# Patient Record
Sex: FEMALE | Race: WHITE
[De-identification: names, ages, dates, MRNs, and addresses within clinical notes are randomized per-mention and may not be internally consistent; named-entity substitution may affect disease eponyms.]

---

## 2019-09-15 ENCOUNTER — HOSPITAL ENCOUNTER (INPATIENT)
Dept: HOSPITAL 92 - ERS | Age: 79
LOS: 6 days | Discharge: HOME HEALTH | DRG: 682 | End: 2019-09-21
Attending: INTERNAL MEDICINE | Admitting: INTERNAL MEDICINE
Payer: MEDICARE

## 2019-09-15 VITALS — BODY MASS INDEX: 28.3 KG/M2

## 2019-09-15 DIAGNOSIS — E11.9: ICD-10-CM

## 2019-09-15 DIAGNOSIS — R33.9: ICD-10-CM

## 2019-09-15 DIAGNOSIS — E87.2: ICD-10-CM

## 2019-09-15 DIAGNOSIS — E44.0: ICD-10-CM

## 2019-09-15 DIAGNOSIS — Z88.5: ICD-10-CM

## 2019-09-15 DIAGNOSIS — E87.6: ICD-10-CM

## 2019-09-15 DIAGNOSIS — E87.1: ICD-10-CM

## 2019-09-15 DIAGNOSIS — I25.10: ICD-10-CM

## 2019-09-15 DIAGNOSIS — Z95.1: ICD-10-CM

## 2019-09-15 DIAGNOSIS — F17.210: ICD-10-CM

## 2019-09-15 DIAGNOSIS — Z71.6: ICD-10-CM

## 2019-09-15 DIAGNOSIS — N17.9: Primary | ICD-10-CM

## 2019-09-15 DIAGNOSIS — G93.41: ICD-10-CM

## 2019-09-15 DIAGNOSIS — E78.5: ICD-10-CM

## 2019-09-15 DIAGNOSIS — I10: ICD-10-CM

## 2019-09-15 DIAGNOSIS — J44.9: ICD-10-CM

## 2019-09-15 DIAGNOSIS — N13.30: ICD-10-CM

## 2019-09-15 PROCEDURE — 80048 BASIC METABOLIC PNL TOTAL CA: CPT

## 2019-09-15 PROCEDURE — 71045 X-RAY EXAM CHEST 1 VIEW: CPT

## 2019-09-15 PROCEDURE — 72100 X-RAY EXAM L-S SPINE 2/3 VWS: CPT

## 2019-09-15 PROCEDURE — 80053 COMPREHEN METABOLIC PANEL: CPT

## 2019-09-15 PROCEDURE — 82550 ASSAY OF CK (CPK): CPT

## 2019-09-15 PROCEDURE — 81001 URINALYSIS AUTO W/SCOPE: CPT

## 2019-09-15 PROCEDURE — 76770 US EXAM ABDO BACK WALL COMP: CPT

## 2019-09-15 PROCEDURE — 84484 ASSAY OF TROPONIN QUANT: CPT

## 2019-09-15 PROCEDURE — 83735 ASSAY OF MAGNESIUM: CPT

## 2019-09-15 PROCEDURE — 85025 COMPLETE CBC W/AUTO DIFF WBC: CPT

## 2019-09-15 PROCEDURE — 70450 CT HEAD/BRAIN W/O DYE: CPT

## 2019-09-15 PROCEDURE — C9113 INJ PANTOPRAZOLE SODIUM, VIA: HCPCS

## 2019-09-15 PROCEDURE — 84156 ASSAY OF PROTEIN URINE: CPT

## 2019-09-15 PROCEDURE — 36416 COLLJ CAPILLARY BLOOD SPEC: CPT

## 2019-09-15 PROCEDURE — 82570 ASSAY OF URINE CREATININE: CPT

## 2019-09-15 PROCEDURE — 74176 CT ABD & PELVIS W/O CONTRAST: CPT

## 2019-09-15 PROCEDURE — 83880 ASSAY OF NATRIURETIC PEPTIDE: CPT

## 2019-09-15 PROCEDURE — 93005 ELECTROCARDIOGRAM TRACING: CPT

## 2019-09-15 PROCEDURE — 93306 TTE W/DOPPLER COMPLETE: CPT

## 2019-09-15 PROCEDURE — 36415 COLL VENOUS BLD VENIPUNCTURE: CPT

## 2019-09-16 LAB
ALBUMIN SERPL BCG-MCNC: 4.2 G/DL (ref 3.4–4.8)
ALP SERPL-CCNC: 94 U/L (ref 40–150)
ALT SERPL W P-5'-P-CCNC: 12 U/L (ref 8–55)
ANION GAP SERPL CALC-SCNC: 16 MMOL/L (ref 10–20)
ANION GAP SERPL CALC-SCNC: 21 MMOL/L (ref 10–20)
AST SERPL-CCNC: 21 U/L (ref 5–34)
BACTERIA UR QL AUTO: (no result) HPF
BASOPHILS # BLD AUTO: 0.1 THOU/UL (ref 0–0.2)
BASOPHILS # BLD AUTO: 0.1 THOU/UL (ref 0–0.2)
BASOPHILS NFR BLD AUTO: 0.5 % (ref 0–1)
BASOPHILS NFR BLD AUTO: 0.7 % (ref 0–1)
BILIRUB SERPL-MCNC: 0.2 MG/DL (ref 0.2–1.2)
BUN SERPL-MCNC: 55 MG/DL (ref 9.8–20.1)
BUN SERPL-MCNC: 56 MG/DL (ref 9.8–20.1)
CALCIUM SERPL-MCNC: 8 MG/DL (ref 7.8–10.44)
CALCIUM SERPL-MCNC: 8.8 MG/DL (ref 7.8–10.44)
CHLORIDE SERPL-SCNC: 108 MMOL/L (ref 98–107)
CHLORIDE SERPL-SCNC: 111 MMOL/L (ref 98–107)
CK SERPL-CCNC: 203 U/L (ref 29–168)
CO2 SERPL-SCNC: 10 MMOL/L (ref 23–31)
CO2 SERPL-SCNC: 9 MMOL/L (ref 23–31)
CREAT CL PREDICTED SERPL C-G-VRATE: 0 ML/MIN (ref 70–130)
CREAT CL PREDICTED SERPL C-G-VRATE: 23 ML/MIN (ref 70–130)
EOSINOPHIL # BLD AUTO: 0 THOU/UL (ref 0–0.7)
EOSINOPHIL # BLD AUTO: 0 THOU/UL (ref 0–0.7)
EOSINOPHIL NFR BLD AUTO: 0.3 % (ref 0–10)
EOSINOPHIL NFR BLD AUTO: 0.4 % (ref 0–10)
GLOBULIN SER CALC-MCNC: 3.4 G/DL (ref 2.4–3.5)
GLUCOSE SERPL-MCNC: 108 MG/DL (ref 83–110)
GLUCOSE SERPL-MCNC: 120 MG/DL (ref 83–110)
HGB BLD-MCNC: 12.6 G/DL (ref 12–16)
HGB BLD-MCNC: 14.3 G/DL (ref 12–16)
LEUKOCYTE ESTERASE UR QL STRIP.AUTO: 25 LEU/UL
LYMPHOCYTES # BLD: 1.5 THOU/UL (ref 1.2–3.4)
LYMPHOCYTES # BLD: 1.9 THOU/UL (ref 1.2–3.4)
LYMPHOCYTES NFR BLD AUTO: 13 % (ref 21–51)
LYMPHOCYTES NFR BLD AUTO: 20 % (ref 21–51)
MCH RBC QN AUTO: 32.8 PG (ref 27–31)
MCH RBC QN AUTO: 33.1 PG (ref 27–31)
MCV RBC AUTO: 94.5 FL (ref 78–98)
MCV RBC AUTO: 95.6 FL (ref 78–98)
MONOCYTES # BLD AUTO: 0.7 THOU/UL (ref 0.11–0.59)
MONOCYTES # BLD AUTO: 0.7 THOU/UL (ref 0.11–0.59)
MONOCYTES NFR BLD AUTO: 6.4 % (ref 0–10)
MONOCYTES NFR BLD AUTO: 7.4 % (ref 0–10)
NEUTROPHILS # BLD AUTO: 6.8 THOU/UL (ref 1.4–6.5)
NEUTROPHILS # BLD AUTO: 9.1 THOU/UL (ref 1.4–6.5)
NEUTROPHILS NFR BLD AUTO: 71.5 % (ref 42–75)
NEUTROPHILS NFR BLD AUTO: 79.8 % (ref 42–75)
PLATELET # BLD AUTO: 275 THOU/UL (ref 130–400)
PLATELET # BLD AUTO: 306 THOU/UL (ref 130–400)
POTASSIUM SERPL-SCNC: 3.3 MMOL/L (ref 3.5–5.1)
POTASSIUM SERPL-SCNC: 3.6 MMOL/L (ref 3.5–5.1)
PROT UR STRIP.AUTO-MCNC: 50 MG/DL
PROT UR-MCNC: 50 MG/DL (ref 1–14)
RBC # BLD AUTO: 3.84 MILL/UL (ref 4.2–5.4)
RBC # BLD AUTO: 4.32 MILL/UL (ref 4.2–5.4)
RBC UR QL AUTO: (no result) HPF (ref 0–3)
SODIUM SERPL-SCNC: 133 MMOL/L (ref 136–145)
SODIUM SERPL-SCNC: 135 MMOL/L (ref 136–145)
WBC # BLD AUTO: 11.5 THOU/UL (ref 4.8–10.8)
WBC # BLD AUTO: 9.5 THOU/UL (ref 4.8–10.8)
WBC UR QL AUTO: (no result) HPF (ref 0–3)

## 2019-09-16 RX ADMIN — HEPARIN SODIUM SCH UNITS: 5000 INJECTION, SOLUTION INTRAVENOUS; SUBCUTANEOUS at 09:34

## 2019-09-16 RX ADMIN — HEPARIN SODIUM SCH UNITS: 5000 INJECTION, SOLUTION INTRAVENOUS; SUBCUTANEOUS at 20:46

## 2019-09-16 NOTE — ULT
US Renal Bilateral STANDARD: 9/16/2019 3:47 PM



CLINICAL HISTORY: Acute kidney injury.



STUDY: Renal ultrasound



COMPARISON: None.                  



FINDINGS:



Right kidney: 

Echogenicity: Normal. 

Masses/cysts:  Tiny anechoic cysts measuring up to 1.2 cm in size.  

Hydronephrosis: Minimal 

Calcifications: None.  

Length: 9.7 cm



Left kidney: 

Echogenicity: Normal. 

Masses/cysts:  None.   

Hydronephrosis: None. 

Calcifications: None.  

Length: 9.7 cm



Limited visualization of the urinary bladder is unremarkable.



IMPRESSION:



1. Minimal right hydronephrosis

2. Right renal cysts



Reported By: Rancho Carrillo 

Electronically Signed:  9/16/2019 6:47 PM

## 2019-09-16 NOTE — RAD
LUMBAR SPINE RADIOGRAPH SERIES 2 TO 3 VIEWS:

 

INDICATION: 

Pain.

 

FINDINGS: 

Moderate multilevel degenerative changes throughout the lumbar spine are present.  There is no acute 
compression fracture or significant subluxation.  Diffuse atherosclerosis is seen.  There is mild rig
ht convexity curvature of the lumbar spine.

 

IMPRESSION: 

Diffuse degenerative change without acute compression fracture or significant subluxation identified.


 

POS: [facility]

## 2019-09-16 NOTE — CT
PRELIMINARY REPORT/[facility]/EMERGENCY AFTER

HOURS PROCEDURE: 

 

EXAM:

CT Head Without Contrast

 

EXAM DATE/TIME:

9/16/2019 12:21 AM

 

CLINICAL HISTORY:

79 years old, female; Weakness, extremity; Patient HX: Patient complains of left hip pain. . Family s
tates that she has weakness and thinks she May be over medicating.

 

TECHNIQUE:

Imaging protocol: Computed tomography of the head without contrast.

 

COMPARISON:

No relevant prior studies available.

 

FINDINGS:

Brain: No mass effect or midline shift. No hemorrhage. Patchy whitte matter hypodensities are nonspec
ific but may be seen in small vessel chronic ischemic changes.

Ventricles: No ventriculomegaly.

Bones/joints: No acute fracture.

Sinuses: Visualized sinuses are unremarkable. No fluid levels.

Mastoid air cells: Visualized mastoid air cells are well aerated. No mastoid effusion.

Soft tissues: Unremarkable.

 

IMPRESSION:

No acute intracranial abnormality.

 

 

 

Thank you for allowing us to participate in the care of your patient.

Dictated and Authenticated by: Karyn Coto MD

09/16/2019 12:35 AM Central Time ([place] & Tu)

 

 

 

 

FINAL REPORT 

 

EMERGENCY AFTER HOURS CT BRAIN:

 

FINDINGS/IMPRESSION: 

I agree with the above provided preliminary interpretation from [facility]. 

No acute intracranial hemorrhage or mass effect. 

Compared to 02/11/11 exam. 

 

 

 

POS: [facility]

## 2019-09-16 NOTE — CON
DATE OF CONSULTATION:  09/16/2019



CONSULTING PHYSICIAN:  Dr. Hernandez.



REASON FOR CONSULT:  Acute kidney injury.



REASON FOR ADMISSION:  Weakness and nausea.



HISTORY OF PRESENT ILLNESS:  This is a 79-year-old white female with history of type

2 diabetes, hypertension, hyperlipidemia, came to the hospital with weakness and she

has been having a lot of back pain lately.  She has not seen doctor for years now.

Last lab was in __________ creatinine was elevated and she remains confused.

Nephrology is consulted.  The patient is very somnolent.  Family was at the bedside.

 The patient is not taking any medications. 



PAST MEDICAL HISTORY:  Positive for type 2 diabetes, hypertension, hyperlipidemia,

coronary artery disease. 



PAST SURGICAL HISTORY:  Back surgery and prior CABG.



HOME MEDICATIONS:  None.



ALLERGIES:  TO HYDROCODONE.



SOCIAL HISTORY:  She smokes 1.5 pack a day.  No alcohol use.



FAMILY HISTORY:  No history of kidney disease.



REVIEW OF SYSTEMS:  Could not be obtained as the patient is somnolent.



PHYSICAL EXAMINATION:

GENERAL:  This is a thin built white female, no apparent distress. 

VITAL SIGNS:  Temperature 97.6, pulse 78, respiratory rate 18, blood pressure

135/53. 

HEENT:  Atraumatic and normocephalic.  Oral mucosa moist. 

NECK:  Supple. 

CV:  S1 and S2 heard.  Rate and rhythm regular. 

RESPIRATORY:  Clear. 

GI:  Abdomen is soft. 

MUSCULOSKELETAL:  No tenderness.  No edema. 

DERMATOLOGIC:  No skin rash. 

NEUROLOGIC:  Alert and awake. 

PSYCHIATRIC:  Normal mood and affect.



LABORATORY DATA:  Hemoglobin is 12.6.  Potassium 3.3, BUN is 55, creatinine is 2.3.

Renal ultrasound is pending. 



ASSESSMENT AND PLAN:  

1. Acute kidney injury.  Creatinine is slightly better.  We will continue hydration.

2. Severe metabolic acidosis.  We will add bicarb.

3. Hypokalemia.  We will add potassium supplements.  We will check magnesium level.

4. Hyponatremia.

5. Edema, controlled.

6. We will check urine studies and ultrasound.

7. Avoid nephrotoxins.  Continue hydration.  We will add bicarb and monitor and

replace potassium. 







Job ID:  [id number]

## 2019-09-16 NOTE — HP
CHIEF COMPLAINT:  Generalized weakness, nausea, and altered mental status.



HISTORY OF PRESENT ILLNESS:  Ms. Malagon is a 79-year-old female with past medical

history of coronary artery disease, type 2 diabetes, hyperlipidemia, hypertension,

not on any medications, presented to the emergency room with generalized weakness,

left hip pain, decreased oral intake, abdominal pain, and increasingly confused as

per family.  The patient stated that she has been having abdominal pain in the last

few days and a pain for which she has been taking ibuprofen.  Workup in the

emergency room, the patient was found to be in acute renal failure with elevated

creatinine.  Imaging studies were done in the ED.  Reports are not available at the

time of dictation, but as per ER physician, no acute findings.  The patient is being

admitted to hospital for further management. 



PAST MEDICAL HISTORY:  

1. Diabetes mellitus, type 2.

2. Hypertension.

3. Hyperlipidemia.

4. Coronary artery disease.



PAST SURGICAL HISTORY:  

1. Back surgery x2.

2. Coronary artery bypass graft surgery, 4-vessel.



SOCIAL HISTORY:  She is a cigarette smoker, about 1.5 pack a day.  Denies alcohol

drinking. 



ALLERGIES:  ALLERGIC TO HYDROCODONE.



HOME MEDICATIONS:  None.



FAMILY HISTORY:  Reviewed and noncontributory.



REVIEW OF SYSTEMS:  Review of 14 systems negative except what is mentioned in

history of present illness. 



PHYSICAL EXAMINATION:

GENERAL:  The patient is awake, alert, does not appear to be in acute distress. 

VITAL SIGNS:  Blood pressure 160/57, pulse is 83, respiratory rate is 20,

temperature is 97.5, pulse oximetry is 98% on 1 L/minute nasal cannula. 

HEAD AND NECK:  Normocephalic and atraumatic.  Neck is supple.  No JVD. 

CHEST:  Fair bilateral air entry. 

HEART:  S1, S2.  Regular. 

ABDOMEN:  Soft with mid abdominal and epigastric tenderness, bowel sounds present. 

NEUROLOGIC:  Awake, alert, oriented.  No focal deficits. 

PSYCH:  Unable to assess. 

EXTREMITIES:  No clubbing, no cyanosis.



LABORATORY DATA:  WBC is 11.5, hemoglobin is 14.3, platelets 306.  Sodium 135,

potassium 3.6, BUN 56, creatinine 2.6, glucose 120.  X-ray of the lumbar spine,

chest x-ray, and brain CT done, report is not available at the time of dictation. 



ASSESSMENT:  

1. Acute renal failure.

2. Acute metabolic encephalopathy.

3. Coronary artery disease with history of coronary artery bypass graft surgery.

4. Diabetes mellitus type 2, diet controlled.

5. Cigarette smoker.



PLAN:  

1. Admit.

2. Cautious IV fluid hydration.

3. Monitor kidney function and urine output.

4. Reassess fluid management in a.m.

5. May need Nephrology consultation in a.m. if no improvement.

6. Avoid nephrotoxic drugs.

7. DVT prophylaxis with SCDs.

8. Expected length of stay two midnights or more.







Job ID:  [id number]

## 2019-09-16 NOTE — RAD
FRONTAL VIEW CHEST:

 

COMPARISON: 

9/23/2016.

 

FINDINGS: 

There is hyperinflation of the lungs with interstitial prominence.  Prior sternotomy.  Cardiac silhou
ette is stable.  There is vascular calcification.

 

IMPRESSION: 

Chronic obstructive pulmonary disease.

 

POS: [facility]

## 2019-09-17 LAB
ALBUMIN SERPL BCG-MCNC: 3.4 G/DL (ref 3.4–4.8)
ALP SERPL-CCNC: 84 U/L (ref 40–150)
ALT SERPL W P-5'-P-CCNC: 14 U/L (ref 8–55)
ANION GAP SERPL CALC-SCNC: 13 MMOL/L (ref 10–20)
AST SERPL-CCNC: 25 U/L (ref 5–34)
BILIRUB SERPL-MCNC: 0.3 MG/DL (ref 0.2–1.2)
BUN SERPL-MCNC: 43 MG/DL (ref 9.8–20.1)
CALCIUM SERPL-MCNC: 8.5 MG/DL (ref 7.8–10.44)
CHLORIDE SERPL-SCNC: 116 MMOL/L (ref 98–107)
CO2 SERPL-SCNC: 13 MMOL/L (ref 23–31)
CREAT CL PREDICTED SERPL C-G-VRATE: 36 ML/MIN (ref 70–130)
GLOBULIN SER CALC-MCNC: 2.9 G/DL (ref 2.4–3.5)
GLUCOSE SERPL-MCNC: 121 MG/DL (ref 83–110)
MAGNESIUM SERPL-MCNC: 2.2 MG/DL (ref 1.6–2.6)
POTASSIUM SERPL-SCNC: 3.4 MMOL/L (ref 3.5–5.1)
SODIUM SERPL-SCNC: 139 MMOL/L (ref 136–145)

## 2019-09-17 RX ADMIN — HEPARIN SODIUM SCH UNITS: 5000 INJECTION, SOLUTION INTRAVENOUS; SUBCUTANEOUS at 08:53

## 2019-09-17 RX ADMIN — HEPARIN SODIUM SCH UNITS: 5000 INJECTION, SOLUTION INTRAVENOUS; SUBCUTANEOUS at 20:00

## 2019-09-17 NOTE — CT
CT Abdomen Pelvis WO Con: 9/17/2019 12:00 AM



HISTORY: Hydronephrosis



COMPARISON: None.



TECHNIQUE: 

Multiple contiguous axial images were obtained and a CT of the abdomen and pelvis without IV contrast
. Coronal and sagittal reformats were performed.



FINDINGS:

This examination is limited for the evaluation of solid organs and vascular structures due to the lac
k of intravenous contrast.



Lower Chest: Atelectasis in the lung bases.



Abdomen:

Liver: within normal limits.

Bile Ducts: Normal caliber.

Gallbladder: Removed

Pancreas: within normal limits.

Spleen: within normal limits.

Adrenals: within normal limits.

Kidneys: Subcentimeter hypodensities in both kidneys are too small to definitely characterize but lik
ely represent cysts. No hydronephrosis is seen.



Pelvis:

Reproductive Organs: No pelvic masses.

Ureters: within normal limits.

Bladder: Decompressed by a Perry catheter.



Bowel: Normal caliber. Scattered diverticula in the colon. Normal appendix

Mesenteric Lymph Nodes: No enlarged mesenteric lymph nodes.

Peritoneum: No ascites or free air, no fluid collection.

Vessels: Atherosclerotic calcifications in the aorta

Retroperitoneum: within normal limits.

Abdominal Wall: within normal limits.

Bones: Degenerative changes in the spine.  



IMPRESSION:





1. No evidence of acute intraabdominal or pelvic abnormality.

2. Diverticulosis

3. Bilateral renal cysts



Reported By: Rancho Carrillo 

Electronically Signed:  9/17/2019 9:33 PM

## 2019-09-17 NOTE — PRG
DATE OF SERVICE:  09/17/2019



SUBJECTIVE:  Patient was seen and examined at bedside and overnight events noted. 



Patient denies any shortness of breath or chest pain or palpitation. 



No history of nausea or vomiting or diarrhea or fever or chills or cramps.



OBJECTIVE:  GENERAL:  This is an elderly female, in no apparent distress. 

VITAL SIGNS:  Temperature 98.3.  Heart rate 82.  Respiratory rate 16.  Blood

pressure 178/60. 

HEENT:  Atraumatic, normocephalic.  Oral mucosa is moist 

NECK:  Supple. 

CARDIOVASCULAR:  S1, S2 heard.  Rate and rhythm regular. 

RESPIRATORY:  Clear to auscultation. 

GASTROINTESTINAL:  Abdomen is soft. 

MUSCULOSKELETAL:  No tenderness.  No edema. 

DERMATOLOGIC:  No skin rash. 

NEUROLOGIC:  Alert and awake and oriented X3.  No focal neurologic deficits. Moving

all the extremities. 

PSYCHIATRIC:  Mood and affect normal.



LABORATORY DATA:  Potassium is 3.4, BUN is 43, creatinine is 1.5.



ASSESSMENT AND PLAN:  

1. Acute kidney injury.  Creatinine getting better.

2. Urinary retention.  We will have continue on Perry.  We will have Urology consult.

3. Severe metabolic acidosis, better.  We will monitor for now.  Might add bicarb.

4. Hypokalemia.  Replace and monitor.

5. Hyponatremia.

6. Edema, controlled.

7. Altered mentation, better. 



Renal function seems to be getting better.  We will have Urology input also given

the urinary retention.  Continue Perry for now. 







Job ID:  [id number]

## 2019-09-17 NOTE — PDOC.HOSPP
- Subjective


Encounter Date: 09/17/19


Subjective: 





Two daughters at bedside, able to provide excellent history.  Patient is in 

chair, eyes closed, drifts off to sleep intermittently, will awaken and answer 

questions.  Speech slurred but per family this is improved.  Sips of oral 

fluids.  Daughter noted oxygen occasionally reading 80%; while in room, oxygen 

applied, order entered





Baseline activity at home is good - she cooks/goes out on errands.  AMS noted 

by family Ho 9/15





- Objective


Vital Signs & Weight: 


 Vital Signs (12 hours)











  Temp Pulse Resp BP Pulse Ox


 


 09/17/19 08:00  98.9 F  85  14  188/66 H  91 L


 


 09/17/19 04:10  98.1 F  86  16  181/66 H  92 L








 Weight











Admit Weight                   164 lb


 


Weight                         164 lb 14.492 oz














I&O: 


 











 09/16/19 09/17/19 09/18/19





 06:59 06:59 06:59


 


Intake Total 300 800 


 


Output Total  1670 


 


Balance 300 -870 











Result Diagrams: 


 09/16/19 04:27





 09/17/19 04:06





Hospitalist ROS





- Medication


Medications: 


Active Medications











Generic Name Dose Route Start Last Admin





  Trade Name Freq  PRN Reason Stop Dose Admin


 


Heparin Sodium (Porcine)  5,000 units  09/16/19 09:00  09/17/19 08:53





  Heparin  SC   5,000 units





  BID KARISSA   Administration





     





     





     





     


 


Pantoprazole Sodium  40 mg  09/16/19 09:00  09/17/19 08:55





  Protonix  IVP  09/19/19 09:01  40 mg





  DAILY KARISSA   Administration





     





     





     





     














- Exam


General - other findings: Opens eyes briefly with stimulation, then drifts back 

to sleep


Eye: PERRL


ENT: dry oral mucosa


Neck: supple


Heart: RRR


Heart - other findings: soft RUSB murmur


Respiratory - other findings: Distant, fairly clear


Gastrointestinal: soft, non-tender, non-distended


Extremities: no edema


Skin: no rashes


Neurological: no focal deficits


Neurological - other findings: slurred speech present, oriented to person, 

knows daughters names


Musculoskeletal: generalized weakness


Psychiatric - other findings: Oriented to person, "2019", "nursing home"





Hosp A/P


(1) ARF (acute renal failure)


Status: Acute   





(2) Metabolic acidosis


Code(s): E87.2 - ACIDOSIS   Status: Acute   





(3) Acute metabolic encephalopathy


Code(s): G93.41 - METABOLIC ENCEPHALOPATHY   Status: Acute   





(4) Coronary artery disease


Code(s): I25.10 - ATHSCL HEART DISEASE OF NATIVE CORONARY ARTERY W/O ANG PCTRS 

  Status: Acute   





(5) Type 2 diabetes mellitus


Status: Acute   





(6) Tobacco dependence


Code(s): F17.200 - NICOTINE DEPENDENCE, UNSPECIFIED, UNCOMPLICATED   Status: 

Acute   





(7) COPD (chronic obstructive pulmonary disease)


Status: Acute   





(8) Hypokalemia


Code(s): E87.6 - HYPOKALEMIA   Status: Acute   





- Plan


PT/OT, DVT proph w/heparin





Renal - appreciate Dr. Martinez, renal US noted, per family urology consult 

planned.  Profound metabolic acidosis without clear etiology; continue bicarb 

infusion, check AML; pollock noted on exam; appears intravascularly dry


Encephalopathy - appears more global, less likely vascular event; general trend 

toward improvement


PT/OT as able


Endo - add accuchecks and ISS, clear liquids as tolerated, speech consult noted

, participation limited due to encephalopathy


DVT proph - heparin


Tobacco dependence/COPD - add oxygen prn; goal sat >90%; consider recheck CXR 

tomorrow if not improving.


Hypokalemia - repleted

## 2019-09-18 LAB
ANION GAP SERPL CALC-SCNC: 12 MMOL/L (ref 10–20)
BASOPHILS # BLD AUTO: 0 THOU/UL (ref 0–0.2)
BASOPHILS NFR BLD AUTO: 0.6 % (ref 0–1)
BUN SERPL-MCNC: 29 MG/DL (ref 9.8–20.1)
CALCIUM SERPL-MCNC: 9.4 MG/DL (ref 7.8–10.44)
CHLORIDE SERPL-SCNC: 116 MMOL/L (ref 98–107)
CO2 SERPL-SCNC: 19 MMOL/L (ref 23–31)
CREAT CL PREDICTED SERPL C-G-VRATE: 56 ML/MIN (ref 70–130)
EOSINOPHIL # BLD AUTO: 0 THOU/UL (ref 0–0.7)
EOSINOPHIL NFR BLD AUTO: 0.6 % (ref 0–10)
GLUCOSE SERPL-MCNC: 111 MG/DL (ref 83–110)
HGB BLD-MCNC: 11.8 G/DL (ref 12–16)
LYMPHOCYTES # BLD: 1.9 THOU/UL (ref 1.2–3.4)
LYMPHOCYTES NFR BLD AUTO: 26.7 % (ref 21–51)
MCH RBC QN AUTO: 32.4 PG (ref 27–31)
MCV RBC AUTO: 95.6 FL (ref 78–98)
MONOCYTES # BLD AUTO: 0.8 THOU/UL (ref 0.11–0.59)
MONOCYTES NFR BLD AUTO: 10.6 % (ref 0–10)
NEUTROPHILS # BLD AUTO: 4.5 THOU/UL (ref 1.4–6.5)
NEUTROPHILS NFR BLD AUTO: 61.5 % (ref 42–75)
PLATELET # BLD AUTO: 305 THOU/UL (ref 130–400)
POTASSIUM SERPL-SCNC: 3.7 MMOL/L (ref 3.5–5.1)
RBC # BLD AUTO: 3.64 MILL/UL (ref 4.2–5.4)
SODIUM SERPL-SCNC: 143 MMOL/L (ref 136–145)
WBC # BLD AUTO: 7.3 THOU/UL (ref 4.8–10.8)

## 2019-09-18 RX ADMIN — HEPARIN SODIUM SCH UNITS: 5000 INJECTION, SOLUTION INTRAVENOUS; SUBCUTANEOUS at 20:48

## 2019-09-18 RX ADMIN — HEPARIN SODIUM SCH UNITS: 5000 INJECTION, SOLUTION INTRAVENOUS; SUBCUTANEOUS at 09:02

## 2019-09-18 NOTE — CON
DATE OF CONSULTATION:  09/17/2019



REQUESTING PHYSICIAN:  Denisa Martinez MD



REASON FOR CONSULTATION:  Urinary retention.



HISTORY OF PRESENT ILLNESS:  Ms. Malagon is a 79-year-old female with no

significant past urologic history, who was admitted for altered mental status,

abdominal pain, and acute kidney injury.  The patient has been having right-sided

abdominal and flank pain radiating to her hip for which she has been taking

ibuprofen at home.  The patient had worsening mental status and her family brought

her to the emergency department.  She was found to have acute kidney injury.  Renal

ultrasound demonstrated some very mild dilation of the right renal collecting system

and normal left kidney.  Per the medical record and per the family evidently, the

patient was in urinary retention.  Evidently, in and out catheter was performed,

which demonstrated minimal residual.  However, when indwelling Perry catheter was

placed, there was significant residual.  This was not confirmed by Nursing clearly

nor by the medical record, but this is the report of the family.  The patient since

being in the hospital has had very slow improvement in her mental status.  The

majority of the history is obtained from the medical record, as well as the

patient's daughter.  The patient's creatinine on admission was 2.38, this improved

to 1.54.  Urology was consulted for further evaluation. 



The patient's family denies any history of kidney stones.  No recurrent urinary

tract infections.  She has never had to see a urologist before.  She does not

receive routine medical care. 



REVIEW OF SYSTEMS:  Unable to obtain secondary to patient's condition.



PAST MEDICAL HISTORY:  Type 2 diabetes mellitus, hypertension, hyperlipidemia,

coronary artery disease. 



PAST SURGICAL HISTORY:  Back surgery x2, coronary artery bypass graft, four-vessel.



SOCIAL HISTORY:  Smokes 1.5 packs per day.  No alcohol.



ALLERGIES:  HYDROCODONE.



HOME MEDICATIONS:  None.



FAMILY HISTORY:  Noncontributory.



PHYSICAL EXAMINATION:

VITAL SIGNS:  Temperature is 98.3, pulse 82, blood pressure 197/71, oxygen

saturation 94% on 2 L nasal cannula. 

GENERAL:  She is somnolent in bed, in no apparent distress. 

HEENT:  Normocephalic, atraumatic. 

NECK:  Supple.  No masses or lymphadenopathy. 

CARDIOVASCULAR:  Regular rate and rhythm. 

PULMONARY:  Breathing unlabored. 

ABDOMEN:  Soft, thin, nontender/nondistended.  No masses or organomegaly.  No

suprapubic tenderness to palpation.  No CVA tenderness. 

GENITOURINARY:  Perry catheter is in place, draining clear yellow urine. 

EXTREMITIES:  Warm, well perfused.  No edema. 

NEUROLOGIC:  The patient able to be awakened and will answer questions, although is

somewhat somnolent. 



RADIOLOGY DATA:  Renal ultrasound demonstrates minimal right hydronephrosis and

right renal cysts. 



LABORATORY DATA:  White blood cell count 9.5, at admission was 11.5; hemoglobin

12.6; hematocrit 36.7; platelets 275.  Sodium 139, potassium 3.4, chloride 116,

bicarb 13, BUN 43, creatinine 1.54, 2.3 on admission. 



ASSESSMENT:  A 79-year-old female with minimal right hydronephrosis, acute kidney

injury, possible urinary retention, altered mental status. 



PLAN:  Etiology of the patient's symptoms is unclear.  It is also unclear whether or

not she was truly in urinary retention.  She did not have bilateral hydronephrosis

and there was only very minimal dilation of the right collecting system on her

ultrasound, so etiology of the acute kidney injury may be multifactorial and not

entirely obstructive in nature.  We will obtain a CT of the abdomen and pelvis to

further evaluate the hydronephrosis.  This may have resolved and no other Perry

catheter is in place.  Further recommendations to follow based on CT results.  If CT

is normal, will plan to leave the patient's Perry catheter in place and she can

follow up with Urology as an outpatient for further workup of her urinary retention. 



Thank you for allowing me to participate in the care of this patient.







Job ID:  [id number]

## 2019-09-18 NOTE — PDOC.HOSPP
- Subjective


Encounter Date: 09/18/19


Encounter Time: 13:45


Subjective: 





pt up in bed no complains. family at bedside





- Objective


Vital Signs & Weight: 


 Vital Signs (12 hours)











  Temp Pulse Resp BP Pulse Ox


 


 09/18/19 11:23  97.6 F  79  18  192/73 H  91 L


 


 09/18/19 07:36  97.6 F  68  15  165/58 H  91 L


 


 09/18/19 03:20  97.9 F  69  16  165/71 H  93 L








 Weight











Admit Weight                   164 lb


 


Weight                         164 lb 14.492 oz














I&O: 


 











 09/17/19 09/18/19 09/19/19





 06:59 06:59 06:59


 


Intake Total 800 590 


 


Output Total 3790 1375 


 


Balance -250 -045 











Result Diagrams: 


 09/18/19 05:19





 09/18/19 05:19


Additional Labs: 


 Accuchecks











  09/18/19 09/18/19 09/17/19





  11:07 05:07 21:01


 


POC Glucose  137 H  127 H  137 H














  09/17/19





  16:27


 


POC Glucose  105














Hospitalist ROS





- Review of Systems


Respiratory: denies: cough, dry, shortness of breath, hemoptysis, SOB with 

excertion, pleuritic pain, sputum, wheezing, other


Cardiovascular: denies: chest pain, palpitations, orthopnea, paroxysmal noc. 

dyspnea, edema, light headedness, other


Gastrointestinal: denies: nausea, vomiting, abdominal pain, diarrhea, 

constipation, melena, hematochezia, other





- Medication


Medications: 


Active Medications











Generic Name Dose Route Start Last Admin





  Trade Name Freq  PRN Reason Stop Dose Admin


 


Bisacodyl  10 mg  09/17/19 14:27  09/17/19 15:56





  Dulcolax  GA   10 mg





  DAILYPRN PRN   Administration





  Constipation   





     





     





     


 


Clonidine  0.1 mg  09/18/19 00:48  09/18/19 00:54





  Catapres  PO   0.1 mg





  Q4H PRN   Administration





  SBP Greater Than 180   





     





     





     


 


Heparin Sodium (Porcine)  5,000 units  09/16/19 09:00  09/18/19 09:02





  Heparin  SC   5,000 units





  BID KARISSA   Administration





     





     





     





     


 


Labetalol HCl  10 mg  09/17/19 13:03  09/18/19 00:00





  Normodyne  SLOW IVP   10 mg





  Q4H PRN   Administration





  SBP Greater Than 180   





     





     





     


 


Pantoprazole Sodium  40 mg  09/16/19 09:00  09/18/19 09:04





  Protonix  IVP  09/19/19 09:01  40 mg





  DAILY KARISSA   Administration





     





     





     





     














- Exam


Neck: negative: supple, symmetric, no JVD, no thyromegaly, no lymphadenopathy, 

no carotid bruit, JVD


Heart: negative: RRR, no murmur, no gallops, no rubs, normal peripheral pulses, 

irregular, diminshed peripheral pulses, murmur present, II/IV, III/IV


Respiratory: negative: CTAB, no wheezes, no rales, no ronchi, normal chest 

expansion, no tachypnea, normal percussion, rales, rhonchi, tachypneic, wheezes





Hosp A/P


(1) Acute metabolic encephalopathy


Code(s): G93.41 - METABOLIC ENCEPHALOPATHY   Status: Acute   





(2) ARF (acute renal failure)


Status: Acute   





(3) COPD (chronic obstructive pulmonary disease)


Status: Acute   





(4) Metabolic acidosis


Code(s): E87.2 - ACIDOSIS   Status: Acute   





(5) Type 2 diabetes mellitus


Status: Acute   





- Plan





pt feels much better today. daughter updated, keo improved, will leave pollock in 

per urology recommendation. will get a echo

## 2019-09-18 NOTE — PQF
CLINICAL DOCUMENTATION IMPROVEMENT CLARIFICATION FORM:  ICD-10 Updated



PLEASE DO AN ADDENDUM TO THE PROGRESS NOTE WITH ANY DOCUMENTATION UPDATES OR 
ADDITIONS AND CARRY THROUGH TO DC SUMMARY.   THANK YOU.



Date:               9/18/19                                            ATTN:    
DR. BATRES



Please exercise your independent, professional judgment in responding to the 
clarification form. 

Clinical indicators are provided on the bottom of this form for your review



Please check appropriate box(s):

[  ] Protein Calorie Malnutrition:    [  ] Mild      [  x] Moderate   [  ] 
Severe   

[  ] Other Malnutrition (please specify) _______________________________________
__

[  ] Underweight without malnutrition

[  ] Cachexia 

[  ] Other diagnosis ___________

[  ] Unable to determine



In addition, please specify:

Present on Admission (POA):  [ x ] Yes             [  ] No             [  ] 
Unable to determine



CLINICAL INDICATORS - SIGNS / SYMPTOMS / LABS



DIETARY NOTE 9/16: "NOT BEEN EATING WELL FOR 3 WEEKS...ONLY BITES HERE AND 
THERE."

"SEVERE TEMPORAL MUSCLE WASTING OBSERVED"



NURSING ASSESSMENT 9/17: "WEAK AND CHAIRFAST"



OCCUPATIONAL THERAPY NOTE 9/16: "IMPAIRED ADLS, DECREASED FUNCTIONAL MOBILITY, 
DECREASED ACTIVITY TOLERANCE"



RISKS:

ADVANCED AGE

ACUTE RENAL FAILURE (H&P 6/16)

DIABETES (H&P 9/16)

DECREASED COGNITION (PER OCCUPATIONAL THERAPY NOTE 9/16)



TREATMENT:

RECOMMENDATIONS FOR NUTRITIONAL SUPPLEMENTS TID (PER DIETARY NOTE 9/16)







Moderate Malnutrition (in acute illness)

Energy Intake: <75% of estimated energy requirement for > 7 days

Weight Loss:  1-2%/1 week;  5%/ 1 month; 7.5%/3 months

Other: mild body fat loss; mild muscle mass loss; mild fluid accumulation; 

Severe Malnutrition (in acute illness)

Energy Intake: < 50% of estimated energy requirement for > 5 days

Weight Loss: >1-2%/1 week; >5%/1 month; >7.5%/3 months

Other: moderate body fat loss; moderate muscle mass loss; moderate- severe 
fluid accumulation; measurably reduced  strength

Moderate Malnutrition (in chronic illness)

Energy Intake: <75% of estimated energy requirement for >1 month

Weight Loss: 5%/1 month; 7.5%/3 months; 10%/6 months; 20%/1 year

Other: mild body fat loss; mild muscle mass loss; mild fluid accumulation

Severe Malnutrition (in chronic illness)

Energy Intake: <75% of estimated energy requirement for >1 month

Weight Loss: >5%/1 month; >7.5%/3 months; >10%/6 months; >20%/1 year

Other: severe body fat loss; severe muscle mass loss; severe fluid accumulation
; measurably reduced  strength









(This form is maintained as a part of the permanent medical record)

 2015 [employer]  All Rights Reserved

[email or web address]    Office:  [phone]

                                                              

 

[facility]

## 2019-09-18 NOTE — PRG
DATE OF SERVICE:  09/18/2019



SUBJECTIVE:  Patient was seen and examined at bedside and overnight events noted. 



Patient denies any shortness of breath or chest pain or palpitation. 



No history of nausea or vomiting or diarrhea or fever or chills or cramps.



OBJECTIVE:  GENERAL:  This is a well-built female, in no apparent distress. 

VITAL SIGNS:  Temperature 97.6.  Heart rate 79.  Respiratory rate 18.  Blood

pressure 192/73. 

HEENT:  Atraumatic, normocephalic.  Oral mucosa is moist 

NECK:  Supple. 

CARDIOVASCULAR:  S1, S2 heard.  Rate and rhythm regular. 

RESPIRATORY:  Clear to auscultation. 

GASTROINTESTINAL:  Abdomen is soft. 

MUSCULOSKELETAL:  No tenderness.  No edema. 

DERMATOLOGIC:  No skin rash. 

NEUROLOGIC:  Alert and awake and oriented X3.  No focal neurologic deficits. Moving

all the extremities. 

PSYCHIATRIC:  Mood and affect normal.



LABORATORY DATA:  Potassium is 3.7, BUN is 29, and creatinine is 0.9.



ASSESSMENT AND PLAN:  

1. Acute kidney injury, much better, almost back to normal.

2. Urinary retention.  Follow Urology.

3. Metabolic acidosis.  Getting better.

4. Hypokalemia.  Replace and monitor.

5. Hyponatremia.

6. Edema.  Controlled.

7. Altered mentation.  Better. 



Stop IV fluids and monitor renal function.  Continue potassium supplements if

needed.  Potassium seems to be stable today. 







Job ID:  [id number]

## 2019-09-19 LAB
ANION GAP SERPL CALC-SCNC: 11 MMOL/L (ref 10–20)
BUN SERPL-MCNC: 24 MG/DL (ref 9.8–20.1)
CALCIUM SERPL-MCNC: 9.2 MG/DL (ref 7.8–10.44)
CHLORIDE SERPL-SCNC: 114 MMOL/L (ref 98–107)
CO2 SERPL-SCNC: 19 MMOL/L (ref 23–31)
CREAT CL PREDICTED SERPL C-G-VRATE: 58 ML/MIN (ref 70–130)
GLUCOSE SERPL-MCNC: 204 MG/DL (ref 83–110)
POTASSIUM SERPL-SCNC: 3.4 MMOL/L (ref 3.5–5.1)
SODIUM SERPL-SCNC: 141 MMOL/L (ref 136–145)

## 2019-09-19 RX ADMIN — HEPARIN SODIUM SCH UNITS: 5000 INJECTION, SOLUTION INTRAVENOUS; SUBCUTANEOUS at 08:29

## 2019-09-19 RX ADMIN — HEPARIN SODIUM SCH UNITS: 5000 INJECTION, SOLUTION INTRAVENOUS; SUBCUTANEOUS at 21:02

## 2019-09-19 NOTE — PRG
DATE OF SERVICE:  09/19/2019



SUBJECTIVE:  Patient was seen and examined at bedside and overnight events noted. 



Patient denies any shortness of breath or chest pain or palpitation. 



No history of nausea or vomiting or diarrhea or fever or chills or cramps.



OBJECTIVE:  GENERAL:  This is an elderly female, in no apparent distress. 

VITAL SIGNS:  Temperature 98.6.  Pulse 76.  Respiratory rate 14.  Blood pressure

172/67. 

HEENT:  Atraumatic, normocephalic.  Oral mucosa is moist 

NECK:  Supple. 

CARDIOVASCULAR:  S1, S2 heard.  Rate and rhythm regular. 

RESPIRATORY:  Clear to auscultation. 

GASTROINTESTINAL:  Abdomen is soft. 

MUSCULOSKELETAL:  No tenderness.  No edema. 

DERMATOLOGIC:  No skin rash. 

NEUROLOGIC:  Alert and awake and oriented X3.  No focal neurologic deficits. Moving

all the extremities. 

PSYCHIATRIC:  Mood and affect normal.



LABORATORY DATA:  Potassium 3.4, BUN is 24, and creatinine is 0.9.



ASSESSMENT AND PLAN:  

1. Acute kidney injury, much better.

2. Urinary retention.  We will follow Urology.

3. Hypokalemia, replaced.

4. Hyponatremia.

5. Edema, controlled.

6. Altered mentation. 

Replace potassium.  Titrate blood pressure medicines.  I will sign off.  Please call

back with any questions. 





Job ID:  [id number]

## 2019-09-19 NOTE — PDOC.HOSPP
- Subjective


Encounter Date: 09/19/19


Encounter Time: 12:55


Subjective: 





pt up in bed feels well. 





- Objective


Vital Signs & Weight: 


 Vital Signs (12 hours)











  Temp Pulse Resp BP BP Pulse Ox


 


 09/19/19 11:25  98.1 F  69  14   172/67 H  95


 


 09/19/19 08:27      164/67 H 


 


 09/19/19 07:58  98.2 F  72  16    96


 


 09/19/19 04:14      158/70 H 


 


 09/19/19 03:45   72   182/63 H  


 


 09/19/19 03:35  98.4 F  74  16    94 L


 


 09/19/19 03:04       93 L








 Weight











Admit Weight                   164 lb


 


Weight                         164 lb 14.492 oz














I&O: 


 











 09/18/19 09/19/19 09/20/19





 06:59 06:59 06:59


 


Intake Total 590 430 


 


Output Total 1375 1000 


 


Balance -782 -758 











Result Diagrams: 


 09/18/19 05:19





 09/19/19 09:27


Additional Labs: 


 Accuchecks











  09/19/19 09/19/19 09/18/19





  11:26 05:09 20:51


 


POC Glucose  153 H  148 H  149 H














  09/18/19





  15:44


 


POC Glucose  100














Hospitalist ROS





- Review of Systems


Respiratory: denies: cough, dry, shortness of breath, hemoptysis, SOB with 

excertion, pleuritic pain, sputum, wheezing, other


Cardiovascular: denies: chest pain, palpitations, orthopnea, paroxysmal noc. 

dyspnea, edema, light headedness, other


Gastrointestinal: denies: nausea, vomiting, abdominal pain, diarrhea, 

constipation, melena, hematochezia, other





- Medication


Medications: 


Active Medications











Generic Name Dose Route Start Last Admin





  Trade Name Freq  PRN Reason Stop Dose Admin


 


Bisacodyl  10 mg  09/17/19 14:27  09/17/19 15:56





  Dulcolax  [place]   10 mg





  DAILYPRN PRN   Administration





  Constipation   





     





     





     


 


Clonidine  0.1 mg  09/18/19 00:48  09/18/19 00:54





  Catapres  PO   0.1 mg





  Q4H PRN   Administration





  SBP Greater Than 180   





     





     





     


 


Heparin Sodium (Porcine)  5,000 units  09/16/19 09:00  09/19/19 08:29





  Heparin  SC   5,000 units





  BID KARISSA   Administration





     





     





     





     


 


Labetalol HCl  10 mg  09/17/19 13:03  09/19/19 03:45





  Normodyne  SLOW IVP   10 mg





  Q4H PRN   Administration





  SBP Greater Than 180   





     





     





     














- Exam


Heart: negative: RRR, no murmur, no gallops, no rubs, normal peripheral pulses, 

irregular, diminshed peripheral pulses, murmur present, II/IV, III/IV


Respiratory: negative: CTAB, no wheezes, no rales, no ronchi, normal chest 

expansion, no tachypnea, normal percussion, rales, rhonchi, tachypneic, wheezes


Gastrointestinal: negative: soft, non-tender, non-distended, normal bowel sounds

, no palpable masses, no hepatomegaly, no splenomegaly, no bruit, no guarding, 

no rigidity, tender to palpation, distended, diminished bowl sounds, voluntary 

guarding





Hosp A/P


(1) Acute metabolic encephalopathy


Code(s): G93.41 - METABOLIC ENCEPHALOPATHY   Status: Acute   





(2) ARF (acute renal failure)


Status: Acute   





(3) COPD (chronic obstructive pulmonary disease)


Status: Acute   





(4) Metabolic acidosis


Code(s): E87.2 - ACIDOSIS   Status: Acute   





(5) Type 2 diabetes mellitus


Status: Acute   





- Plan





pt feels much better today. daughter updated, keo improved, will leave pollock in 

per urology recommendation. will get a echo





9/19 pt's echo pending, keo resolved, will replace k

## 2019-09-20 LAB
ANION GAP SERPL CALC-SCNC: 8 MMOL/L (ref 10–20)
BUN SERPL-MCNC: 20 MG/DL (ref 9.8–20.1)
CALCIUM SERPL-MCNC: 9.4 MG/DL (ref 7.8–10.44)
CHLORIDE SERPL-SCNC: 112 MMOL/L (ref 98–107)
CO2 SERPL-SCNC: 24 MMOL/L (ref 23–31)
CREAT CL PREDICTED SERPL C-G-VRATE: 58 ML/MIN (ref 70–130)
GLUCOSE SERPL-MCNC: 152 MG/DL (ref 83–110)
POTASSIUM SERPL-SCNC: 3.5 MMOL/L (ref 3.5–5.1)
SODIUM SERPL-SCNC: 140 MMOL/L (ref 136–145)

## 2019-09-20 RX ADMIN — INSULIN LISPRO PRN UNIT: 100 INJECTION, SOLUTION INTRAVENOUS; SUBCUTANEOUS at 11:47

## 2019-09-20 RX ADMIN — INSULIN LISPRO PRN UNIT: 100 INJECTION, SOLUTION INTRAVENOUS; SUBCUTANEOUS at 05:42

## 2019-09-20 RX ADMIN — HEPARIN SODIUM SCH UNITS: 5000 INJECTION, SOLUTION INTRAVENOUS; SUBCUTANEOUS at 20:41

## 2019-09-20 RX ADMIN — INSULIN LISPRO PRN UNIT: 100 INJECTION, SOLUTION INTRAVENOUS; SUBCUTANEOUS at 20:53

## 2019-09-20 RX ADMIN — HEPARIN SODIUM SCH UNITS: 5000 INJECTION, SOLUTION INTRAVENOUS; SUBCUTANEOUS at 09:00

## 2019-09-20 NOTE — PDOC.HOSPP
- Subjective


Encounter Date: 09/20/19


Encounter Time: 11:30


Subjective: 





pt up in bed no complains, daughter states that at rest her oxygen drops, when 

nursing ambulated pt she was 92% 





- Objective


Vital Signs & Weight: 


 Vital Signs (12 hours)











  Temp Pulse Resp BP Pulse Ox


 


 09/20/19 11:28  97.8 F  65  16  115/64  92 L


 


 09/20/19 08:00  98.0 F  69  16  160/69 H  95


 


 09/20/19 05:28      93 L


 


 09/20/19 03:50  98.1 F  68  16  148/56 H  92 L








 Weight











Admit Weight                   164 lb


 


Weight                         164 lb 14.492 oz














I&O: 


 











 09/19/19 09/20/19 09/21/19





 06:59 06:59 06:59


 


Intake Total 430 1150 


 


Output Total 1000 775 


 


Balance -570 375 











Result Diagrams: 


 09/18/19 05:19





 09/20/19 04:41


Additional Labs: 


 Accuchecks











  09/20/19 09/20/19 09/19/19





  11:46 05:20 21:25


 


POC Glucose  196 H  185 H  96














  09/19/19 09/19/19





  15:21 11:26


 


POC Glucose  100  153 H














Hospitalist ROS





- Review of Systems


Respiratory: denies: cough, dry, shortness of breath, hemoptysis, SOB with 

excertion, pleuritic pain, sputum, wheezing, other


Cardiovascular: denies: chest pain, palpitations, orthopnea, paroxysmal noc. 

dyspnea, edema, light headedness, other


Gastrointestinal: denies: nausea, vomiting, abdominal pain, diarrhea, 

constipation, melena, hematochezia, other





- Medication


Medications: 


Active Medications











Generic Name Dose Route Start Last Admin





  Trade Name Freq  PRN Reason Stop Dose Admin


 


Bisacodyl  10 mg  09/17/19 14:27  09/17/19 15:56





  Dulcolax  [place]   10 mg





  DAILYPRN PRN   Administration





  Constipation   





     





     





     


 


Carvedilol  12.5 mg  09/20/19 08:00  09/20/19 09:00





  Coreg  PO   12.5 mg





  BID-WM KARISSA   Administration





     





     





     





     


 


Clonidine  0.1 mg  09/18/19 00:48  09/18/19 00:54





  Catapres  PO   0.1 mg





  Q4H PRN   Administration





  SBP Greater Than 180   





     





     





     


 


Heparin Sodium (Porcine)  5,000 units  09/16/19 09:00  09/20/19 09:00





  Heparin  SC   5,000 units





  BID KARISSA   Administration





     





     





     





     


 


Insulin Human Lispro  0 units  09/17/19 11:29  09/20/19 11:47





  Humalog  SC   2 unit





  .MILD SLIDING SCALE PRN   Administration





  Mild Correctional Scale   





     





     





     


 


Labetalol HCl  10 mg  09/17/19 13:03  09/19/19 03:45





  Normodyne  SLOW IVP   10 mg





  Q4H PRN   Administration





  SBP Greater Than 180   





     





     





     














- Exam


Neck: negative: supple, symmetric, no JVD, no thyromegaly, no lymphadenopathy, 

no carotid bruit, JVD


Heart: negative: RRR, no murmur, no gallops, no rubs, normal peripheral pulses, 

irregular, diminshed peripheral pulses, murmur present, II/IV, III/IV


Respiratory: negative: CTAB, no wheezes, no rales, no ronchi, normal chest 

expansion, no tachypnea, normal percussion, rales, rhonchi, tachypneic, wheezes





Hosp A/P


(1) Acute metabolic encephalopathy


Code(s): G93.41 - METABOLIC ENCEPHALOPATHY   Status: Acute   





(2) ARF (acute renal failure)


Status: Acute   





(3) COPD (chronic obstructive pulmonary disease)


Status: Acute   





(4) Metabolic acidosis


Code(s): E87.2 - ACIDOSIS   Status: Acute   





(5) Type 2 diabetes mellitus


Status: Acute   





- Plan





pt feels much better today. daughter updated, keo improved, will leave pollock in 

per urology recommendation. will get a echo





9/19 pt's echo pending, keo resolved, will replace k





9/20 echo just got done today, will need to wait for read. pt's daughter 

concern for her variable oxygen. will ask nurse to use the same pulse ox that 

was used for ambulate to use at rest. her creatinine is stable. she will need 

to follow up with pulmonary and primary which she does not have.

## 2019-09-21 VITALS — TEMPERATURE: 97.4 F | SYSTOLIC BLOOD PRESSURE: 150 MMHG | DIASTOLIC BLOOD PRESSURE: 71 MMHG

## 2019-09-21 RX ADMIN — HEPARIN SODIUM SCH UNITS: 5000 INJECTION, SOLUTION INTRAVENOUS; SUBCUTANEOUS at 08:57

## 2019-09-21 NOTE — PDOC.HOSPP
- Subjective


Encounter Date: 09/21/19


Encounter Time: 08:00


Subjective: 





Patient seen and examined. No new complaints. No overnight events





- Objective


Vital Signs & Weight: 


 Vital Signs (12 hours)











  Temp Pulse Resp BP BP Pulse Ox


 


 09/21/19 08:57     155/66 H  


 


 09/21/19 07:09  98.4 F  65  18   155/66 H  91 L


 


 09/21/19 03:53  98.1 F  66  16   131/54 L  91 L


 


 09/21/19 01:17       95


 


 09/20/19 23:38  98.1 F  60  16   156/54 H  94 L








 Weight











Admit Weight                   164 lb


 


Weight                         164 lb 14.492 oz














I&O: 


 











 09/20/19 09/21/19 09/22/19





 06:59 06:59 06:59


 


Intake Total 1150 600 


 


Output Total 775 1550 


 


Balance 375 -950 











Result Diagrams: 


 09/18/19 05:19





 09/20/19 04:41


Additional Labs: 


 Accuchecks











  09/21/19 09/21/19 09/20/19





  10:33 05:17 20:53


 


POC Glucose  203 H  98  181 H














  09/20/19 09/20/19





  16:01 11:46


 


POC Glucose  114 H  196 H














Hospitalist ROS





- Review of Systems


ENT: denies: ear pain, ear discharge, nose pain, nose discharge, nose congestion

, mouth pain, mouth swelling, throat pain, throat swelling, other


Respiratory: denies: cough, dry, shortness of breath, hemoptysis, SOB with 

excertion, pleuritic pain, sputum, wheezing, other


Cardiovascular: denies: chest pain, palpitations, orthopnea, paroxysmal noc. 

dyspnea, edema, light headedness, other


Gastrointestinal: denies: nausea, vomiting, abdominal pain, diarrhea, 

constipation, melena, hematochezia, other


Genitourinary: denies: dysuria, frequency, incontinence, hematuria, retention, 

other


Musculoskeletal: denies: neck pain, shoulder pain, arm pain, back pain, hand 

pain, leg pain, foot pain, other


Skin: denies: rash, lesions, harjinder, bruising, other





- Medication


Medications: 


Active Medications











Generic Name Dose Route Start Last Admin





  Trade Name Freq  PRN Reason Stop Dose Admin


 


Bisacodyl  10 mg  09/17/19 14:27  09/17/19 15:56





  Dulcolax  [place]   10 mg





  DAILYPRN PRN   Administration





  Constipation   





     





     





     


 


Carvedilol  12.5 mg  09/20/19 08:00  09/21/19 08:57





  Coreg  PO   12.5 mg





  BID-WM KARISSA   Administration





     





     





     





     


 


Clonidine  0.1 mg  09/18/19 00:48  09/18/19 00:54





  Catapres  PO   0.1 mg





  Q4H PRN   Administration





  SBP Greater Than 180   





     





     





     


 


Heparin Sodium (Porcine)  5,000 units  09/16/19 09:00  09/21/19 08:57





  Heparin  SC   5,000 units





  BID KARISSA   Administration





     





     





     





     


 


Insulin Human Lispro  0 units  09/17/19 11:29  09/20/19 20:53





  Humalog  SC   2 unit





  .MILD SLIDING SCALE PRN   Administration





  Mild Correctional Scale   





     





     





     


 


Labetalol HCl  10 mg  09/17/19 13:03  09/19/19 03:45





  Normodyne  SLOW IVP   10 mg





  Q4H PRN   Administration





  SBP Greater Than 180   





     





     





     














- Exam


General Appearance: NAD, awake alert


Eye: PERRL, anicteric sclera


ENT: normocephalic atraumatic, no oropharyngeal lesions


Neck: supple, symmetric, no JVD, no thyromegaly


Heart: RRR, no gallops, no rubs, normal peripheral pulses, murmur present, III/

IV


Respiratory: CTAB, no wheezes, no rales, no ronchi


Gastrointestinal: soft, non-tender, non-distended, normal bowel sounds


Extremities: no cyanosis, no clubbing, no edema


Skin: normal turgor, no lesions


Neurological: cranial nerve grossly intact, no focal deficits


Musculoskeletal: normal tone, normal strength


Psychiatric: normal affect, normal behavior





Hosp A/P


(1) ARF (acute renal failure)


Status: Acute   





(2) COPD (chronic obstructive pulmonary disease)


Status: Acute   





(3) Coronary artery disease


Code(s): I25.10 - ATHSCL HEART DISEASE OF NATIVE CORONARY ARTERY W/O ANG PCTRS 

  Status: Acute   





(4) Tobacco dependence


Code(s): F17.200 - NICOTINE DEPENDENCE, UNSPECIFIED, UNCOMPLICATED   Status: 

Acute   





(5) Type 2 diabetes mellitus


Status: Acute   





- Plan


old records reviewed/req, plan discussed w/ family, pollock catheter, social 

services





will add metformin, continue coreg


home health


echo pending result


DC to home

## 2019-09-21 NOTE — EKG
Test Reason : 

Blood Pressure : ***/*** mmHG

Vent. Rate : 082 BPM     Atrial Rate : 082 BPM

   P-R Int : 184 ms          QRS Dur : 138 ms

    QT Int : 410 ms       P-R-T Axes : 077 -23 126 degrees

   QTc Int : 479 ms

 

Normal sinus rhythm

Possible Left atrial enlargement

Left ventricular hypertrophy with QRS widening and repolarization abnormality

Abnormal ECG

 

 

Confirmed by CHRISTINE IRVING ([id number]),  TRISTAN YUSUF ([id number]) on 9/21/2019 1:21:20 PM

 

Referred By:             Confirmed By:CHRISTINE IRVING

## 2019-09-21 NOTE — DIS
DATE OF ADMISSION:  09/16/2019



DATE OF DISCHARGE:  09/21/2019



PRIMARY CARE PHYSICIAN:  [facility].



DISCHARGE DISPOSITION:  Home with home health.



PRIMARY DISCHARGE DIAGNOSES:  

1. Acute metabolic encephalopathy, resolved.

2. Acute kidney failure, improved.



SECONDARY DISCHARGE DIAGNOSES:  

1. Diabetes type 2.

2. Tobacco abuse disorder.

3. Chronic obstructive pulmonary disease.

4. Coronary artery disease.



PRIMARY PROCEDURE/OPERATION:  None.



RADIOLOGICAL INVESTIGATION:  CT brain negative for any acute intracranial process.

Chest x-ray negative for any acute cardiopulmonary process.  CT abdomen and pelvis

showed diverticulosis.  Renal ultrasound was negative for any hydronephrosis.

Lumbar spine x-rays also unremarkable. 



SIGNIFICANT LABORATORY DATA:  WBC 7.3, hemoglobin 11.8, platelets 305.  Sodium 140,

potassium 3.5, BUN 20, creatinine 0.94, calcium 9.4. 



DISCHARGE MEDICATIONS:  

1. Coreg 12.5 mg twice daily.

2. Metformin 500 mg p.o. b.i.d.



CONTRAINDICATION:  None.



CODE STATUS:  Full code.



INPATIENT CONSULTANT:  Urology, Dr. Nisha Schneider consulted while in hospital for

urinary retention and he recommended to leave Perry catheter in on discharge and

outpatient voiding trial.  Dr. Martinez was following for acute kidney failure. 



TEST RESULTS PENDING ON DISCHARGE:  None.



ALLERGIES:  HYDROCODONE.



DISCHARGE PLAN:  Posthospital, the patient will follow up with primary care

physician.  The patient's family member instructed to follow up with Urology in 1

week and make appointment with Nephrology if needed. 



HOSPITAL COURSE:  A 79-year-old female with above-mentioned medical problem, who was

admitted by Dr. Herr.  Please see his H and P for further details.  On

admission, the patient had urinary retention.  She had acute kidney failure.  Her

creatinine on admission was 2.67 with metabolic acidosis.  The patient also had

hypokalemia.  The patient was treated with IV fluid.  Her potassium was replaced.

Her renal function improved to normal.  Urology was consulted for urinary retention,

and Nephrology was following for acute kidney failure.  Urology recommended to leave

Perry catheter in.  The patient had mild right-sided hydronephrosis of unclear

etiology.  Urology will do further evaluation as an outpatient basis.  We prescribed

metformin for diabetes and Coreg for high blood pressure.  Rest of medications she

will get from her primary care physician. 



Smoking cessation counseling given healthy lifestyle measure discussed with the

patient.  We are arranging home health upon discharge for skilled nursing and PT. 



The patient is seen and examined at bedside today.  Please see my progress note from

today for further details.  This patient has murmur, and echocardiography was

obtained, result is pending by the time of dictation.  The patient's primary care

physician will follow up on the echo result. 







Job ID:  [id number]

## 2019-10-02 ENCOUNTER — HOSPITAL ENCOUNTER (OUTPATIENT)
Dept: HOSPITAL 92 - BICRAD | Age: 79
Discharge: HOME | End: 2019-10-02
Attending: FAMILY MEDICINE
Payer: MEDICARE

## 2019-10-02 DIAGNOSIS — M16.12: ICD-10-CM

## 2019-10-02 DIAGNOSIS — M79.605: Primary | ICD-10-CM

## 2019-10-02 DIAGNOSIS — M17.12: ICD-10-CM

## 2019-10-02 NOTE — RAD
LEFT HIP

10/2/19

 

INDICATIONS:

Hip pain. Injury. 

 

There are mild degenerative changes of the hip with spurring from the femoral head. No evidence of ac
[place] fracture identified. 

 

IMPRESSION: 

Degenerative changes left hip. No acute fracture identified. 

 

POS: OFF

## 2019-10-02 NOTE — RAD
LEFT KNEE:

10/2/19

 

Four views.

 

HISTORY: 

Knee pain.

 

Joint spaces are preserved. Mild spurring from the medial condyles and tibial spines. Minimal spurrin
g from the patella. No fracture or acute osseous abnormality. No joint effusion. 

 

IMPRESSION: 

Mild degenerative change. 

 

POS: OFF

## 2019-11-21 ENCOUNTER — HOSPITAL ENCOUNTER (OUTPATIENT)
Dept: HOSPITAL 92 - BICRAD | Age: 79
Discharge: HOME | End: 2019-11-21
Attending: FAMILY MEDICINE
Payer: MEDICARE

## 2019-11-21 DIAGNOSIS — M47.816: ICD-10-CM

## 2019-11-21 DIAGNOSIS — M43.16: ICD-10-CM

## 2019-11-21 DIAGNOSIS — M54.5: Primary | ICD-10-CM

## 2019-11-21 PROCEDURE — 72100 X-RAY EXAM L-S SPINE 2/3 VWS: CPT

## 2019-11-21 NOTE — RAD
6 views lumbar spine:

11/21/2019



COMPARISON: 9/16/2019



HISTORY: Low back pain, left lower extremity radiculopathy



FINDINGS: Lumbar pedicles appear intact on the frontal imaging. There is multilevel lateral osteophyt
e formation within the lumbar spine, most prominent on the left at L2-3 and L3-4.



No obvious pars defect is identified at any level on the oblique views.



Neutral lateral exam demonstrates new retrolisthesis at L3-4 measuring 6 mm.



Multilevel facet hypertrophy within the lumbar spine including L3-4, L4-5, and L5-S1. Disc space narr
owing with anterior osteophyte formation noted at L3-4.



On the flexion imaging the retrolisthesis at L3-4 decreased to 4 mm and on extension increases to 7 m
m.



No acute osseous abnormality. There is atherosclerotic calcification of the abdominal aorta.



IMPRESSION: Degenerative change within the lumbar spine as detailed above. New retrolisthesis at L3-4
, most prominent with extension.



Reported By: Ubaldo Garner 

Electronically Signed:  11/21/2019 10:53 AM bilateral normal...